# Patient Record
Sex: FEMALE | Race: BLACK OR AFRICAN AMERICAN | Employment: OTHER | URBAN - METROPOLITAN AREA
[De-identification: names, ages, dates, MRNs, and addresses within clinical notes are randomized per-mention and may not be internally consistent; named-entity substitution may affect disease eponyms.]

---

## 2022-03-18 PROBLEM — K22.2 PEPTIC STRICTURE OF ESOPHAGUS: Status: ACTIVE | Noted: 2017-10-16

## 2022-03-18 PROBLEM — N20.1 URETERAL CALCULI: Status: ACTIVE | Noted: 2018-05-01

## 2022-03-19 PROBLEM — M79.89 LEG SWELLING: Status: ACTIVE | Noted: 2017-10-16

## 2022-03-19 PROBLEM — G62.0 CHEMOTHERAPY-INDUCED NEUROPATHY (HCC): Status: ACTIVE | Noted: 2018-04-11

## 2022-03-19 PROBLEM — R74.8 ELEVATED VITAMIN B12 LEVEL: Status: ACTIVE | Noted: 2017-06-27

## 2022-03-19 PROBLEM — T45.1X5A CHEMOTHERAPY-INDUCED NEUROPATHY (HCC): Status: ACTIVE | Noted: 2018-04-11

## 2022-03-19 PROBLEM — J32.0 CHRONIC MAXILLARY SINUSITIS: Status: ACTIVE | Noted: 2017-10-16

## 2022-03-20 PROBLEM — R20.2 PARESTHESIA: Status: ACTIVE | Noted: 2017-10-16

## 2024-03-25 ENCOUNTER — HOSPITAL ENCOUNTER (OUTPATIENT)
Facility: HOSPITAL | Age: 64
Setting detail: OUTPATIENT SURGERY
Discharge: HOME | End: 2024-03-28
Payer: OTHER GOVERNMENT

## 2024-03-25 VITALS
TEMPERATURE: 97.4 F | OXYGEN SATURATION: 100 % | HEIGHT: 64 IN | DIASTOLIC BLOOD PRESSURE: 79 MMHG | HEART RATE: 91 BPM | SYSTOLIC BLOOD PRESSURE: 125 MMHG | WEIGHT: 111.4 LBS | RESPIRATION RATE: 16 BRPM | BODY MASS INDEX: 19.02 KG/M2

## 2024-03-25 DIAGNOSIS — N20.0 KIDNEY STONE: Primary | ICD-10-CM

## 2024-03-25 LAB — GLUCOSE BLD STRIP.AUTO-MCNC: 77 MG/DL (ref 70–110)

## 2024-03-25 PROCEDURE — 7100000011 HC PHASE II RECOVERY - ADDTL 15 MIN

## 2024-03-25 PROCEDURE — 6360000002 HC RX W HCPCS: Performed by: UROLOGY

## 2024-03-25 PROCEDURE — 2580000003 HC RX 258: Performed by: UROLOGY

## 2024-03-25 PROCEDURE — 99152 MOD SED SAME PHYS/QHP 5/>YRS: CPT

## 2024-03-25 PROCEDURE — 2720000010 HC SURG SUPPLY STERILE

## 2024-03-25 PROCEDURE — 82962 GLUCOSE BLOOD TEST: CPT

## 2024-03-25 PROCEDURE — 50590 FRAGMENTING OF KIDNEY STONE: CPT

## 2024-03-25 PROCEDURE — 2709999900 HC NON-CHARGEABLE SUPPLY

## 2024-03-25 PROCEDURE — 7100000010 HC PHASE II RECOVERY - FIRST 15 MIN

## 2024-03-25 PROCEDURE — 99153 MOD SED SAME PHYS/QHP EA: CPT

## 2024-03-25 RX ORDER — FLUMAZENIL 0.1 MG/ML
0.5 INJECTION INTRAVENOUS ONCE
Status: DISCONTINUED | OUTPATIENT
Start: 2024-03-25 | End: 2024-03-29 | Stop reason: HOSPADM

## 2024-03-25 RX ORDER — TRAMADOL HYDROCHLORIDE 50 MG/1
50 TABLET ORAL EVERY 6 HOURS PRN
Qty: 12 TABLET | Refills: 0 | Status: SHIPPED | OUTPATIENT
Start: 2024-03-25 | End: 2024-03-28

## 2024-03-25 RX ORDER — MIDAZOLAM HYDROCHLORIDE 1 MG/ML
6 INJECTION INTRAMUSCULAR; INTRAVENOUS PRN
Status: DISCONTINUED | OUTPATIENT
Start: 2024-03-25 | End: 2024-03-29 | Stop reason: HOSPADM

## 2024-03-25 RX ORDER — SODIUM CHLORIDE, SODIUM LACTATE, POTASSIUM CHLORIDE, CALCIUM CHLORIDE 600; 310; 30; 20 MG/100ML; MG/100ML; MG/100ML; MG/100ML
INJECTION, SOLUTION INTRAVENOUS CONTINUOUS
Status: DISCONTINUED | OUTPATIENT
Start: 2024-03-25 | End: 2024-03-29 | Stop reason: HOSPADM

## 2024-03-25 RX ORDER — NALOXONE HYDROCHLORIDE 0.4 MG/ML
1 INJECTION, SOLUTION INTRAMUSCULAR; INTRAVENOUS; SUBCUTANEOUS PRN
Status: DISCONTINUED | OUTPATIENT
Start: 2024-03-25 | End: 2024-03-29 | Stop reason: HOSPADM

## 2024-03-25 RX ORDER — OXYCODONE HYDROCHLORIDE AND ACETAMINOPHEN 5; 325 MG/1; MG/1
1 TABLET ORAL EVERY 4 HOURS PRN
OUTPATIENT
Start: 2024-03-25

## 2024-03-25 RX ORDER — FENTANYL CITRATE 50 UG/ML
300 INJECTION, SOLUTION INTRAMUSCULAR; INTRAVENOUS PRN
Status: DISCONTINUED | OUTPATIENT
Start: 2024-03-25 | End: 2024-03-29 | Stop reason: HOSPADM

## 2024-03-25 RX ORDER — SODIUM CHLORIDE, SODIUM LACTATE, POTASSIUM CHLORIDE, CALCIUM CHLORIDE 600; 310; 30; 20 MG/100ML; MG/100ML; MG/100ML; MG/100ML
INJECTION, SOLUTION INTRAVENOUS CONTINUOUS
OUTPATIENT
Start: 2024-03-25

## 2024-03-25 RX ADMIN — MIDAZOLAM HYDROCHLORIDE 2 MG: 1 INJECTION, SOLUTION INTRAMUSCULAR; INTRAVENOUS at 15:27

## 2024-03-25 RX ADMIN — FENTANYL CITRATE 100 MCG: 50 INJECTION INTRAMUSCULAR; INTRAVENOUS at 15:26

## 2024-03-25 RX ADMIN — SODIUM CHLORIDE, POTASSIUM CHLORIDE, SODIUM LACTATE AND CALCIUM CHLORIDE: 600; 310; 30; 20 INJECTION, SOLUTION INTRAVENOUS at 14:19

## 2024-03-25 RX ADMIN — WATER 2000 MG: 1 INJECTION INTRAMUSCULAR; INTRAVENOUS; SUBCUTANEOUS at 15:18

## 2024-03-25 ASSESSMENT — PAIN - FUNCTIONAL ASSESSMENT
PAIN_FUNCTIONAL_ASSESSMENT: NONE - DENIES PAIN
PAIN_FUNCTIONAL_ASSESSMENT: NONE - DENIES PAIN

## 2024-03-25 ASSESSMENT — PAIN SCALES - GENERAL: PAINLEVEL_OUTOF10: 3

## 2024-03-25 NOTE — PERIOP NOTE
EKG strip taped to EKG monitoring sheet    Staff:  Dr. Wendi Huff, RN  Roxie Sepulveda, RT    Procedure: Extracorporeal Shockwave Lithotripsy-Left Kidney     Treatment site: Left flank area-Abrasion

## 2024-03-25 NOTE — DISCHARGE INSTR - SUPPLEMENTARY INSTRUCTIONS
DISCHARGE SUMMARY from Nurse    PATIENT INSTRUCTIONS:    After general anesthesia or intravenous sedation, for 24 hours or while taking prescription Narcotics:  Limit your activities  Do not drive and operate hazardous machinery  Do not make important personal or business decisions  Do  not drink alcoholic beverages  If you have not urinated within 8 hours after discharge, please contact your surgeon on call.    Report the following to your surgeon:  Excessive pain, swelling, redness or odor of or around the surgical area  Temperature over 100.5  Nausea and vomiting lasting longer than 4 hours or if unable to take medications  Any signs of decreased circulation or nerve impairment to extremity: change in color, persistent  numbness, tingling, coldness or increase pain  Any questions    What to do at Home:  Recommended activity: activity as tolerated.    *  Please give a list of your current medications to your Primary Care Provider.    *  Please update this list whenever your medications are discontinued, doses are      changed, or new medications (including over-the-counter products) are added.    *  Please carry medication information at all times in case of emergency situations.    These are general instructions for a healthy lifestyle:    No smoking/ No tobacco products/ Avoid exposure to second hand smoke  Surgeon General's Warning:  Quitting smoking now greatly reduces serious risk to your health.    Obesity, smoking, and sedentary lifestyle greatly increases your risk for illness    A healthy diet, regular physical exercise & weight monitoring are important for maintaining a healthy lifestyle    You may be retaining fluid if you have a history of heart failure or if you experience any of the following symptoms:  Weight gain of 3 pounds or more overnight or 5 pounds in a week, increased swelling in our hands or feet or shortness of breath while lying flat in bed.  Please call your doctor as soon as you

## 2024-03-25 NOTE — H&P
Update History & Physical    The patient's History and Physical of February 27, 2024 was reviewed with the patient and I examined the patient. There was no change. The surgical site was confirmed by the patient and me.       Plan: The risks, benefits, expected outcome, and alternative to the recommended procedure have been discussed with the patient. Patient understands and wants to proceed with the procedure.     Electronically signed by Rajesh Adame MD on 3/25/2024 at 3:14 PM      [facility]  [address]  [facility] [address]  Tel:  [phone]  Fax: [phone]    Patient :  Madelyn Brambila  YOB: 1960  Birth Sex:  Female  Current Gender:  Female  Date:   02/27/2024 9:15 AM   Visit Type:    Office Visit  Assessment/Plan  #  Detail Type  Description    Assessment  Calculus of kidney (N20.0).    Patient Plan  Chronic unstable   Management:  Mildly symptomatic left kidney stone.  Discussed treatment with patient.  Patient wishes to undergo ESWL.  Plan for left ESWL.  PAT per hospital protocol.  Follow-up:  Next available left ESWL.  Renal.              Additional Visit Information    This 64 year old patient presents for Kidney and/or Ureteral Stone.    History of Present Illness  1.  Kidney and/or Ureteral Stone   Onset was gradual. The problem is improving. Prior stone type of unknown. There have not been any recent ER visits. Pertinent history includes history of prior stone(s) and past lithotripsy. There are no aggravating factors. The patient lists no relieving factors. Pertinent negatives include chills, constipation, diarrhea, fever, nausea, urinary blood clots, dribbling (urinary), frequency (urinary), urinary straining and vomiting. Additional information: hx of breast cancer 2012. Pt has hx of kidney stones treated by Dr. Aaron Magallanes started 2018. Had ESWL 7/2020. Pt had renal US 10/16/20 rt 7mm and 3mm stone lt 5mm stone kidney and mild left

## 2024-03-25 NOTE — OP NOTE
Operative Note      Patient: Madelyn Brambila  YOB: 1960  MRN: [id number]    Date of Procedure: 3/25/2024    Kidney stone Left    Post-Op Diagnosis: same as preop       Left Renal ESWL    Assistant:   None    Anesthesia: * No anesthesia type entered *    Estimated Blood Loss (mL): zero    Complications: None    Specimens:   * No specimens in log *    Implants:  * No implants in log *      Drains: * No LDAs found *    Findings: Left lower pole 7mm kidney stone, fragmented well on fluoroscopy.        Detailed Description of Procedure:   Procedure Details:  The patient was brought into the OR and was identified. Time out was called using two identifiers. She received conscious sedation using fentanyl and versed as needed by the nurse. The F1 and F2 focus points were used to locate the stone on the Left kidney. Once the stone was localized, the "[employer] lithotripter machine was used for the procedure.  The rate was 90 shocks per minute. Power was increased from 3 to 7. A total shock of 3000 was applied to the stone. At the completion, the stone looked to be fragmented well.    The patient tolerated the procedure very well. He was aroused safely and transferred to the PACU in stable condition.     Plan:  KUB in 2 weeks prior to office visit.     Electronically signed by Rajesh Adame MD on 3/25/2024 at 4:01 PM

## 2024-03-25 NOTE — DISCHARGE INSTRUCTIONS
[facility], Urology     [facility], [address] [facility], [address], [place] [address]  Office: [phone]  Fax:    [phone]    PROCEDURE     Left renal ESWL  __  NOTIFY [facility] IMMEDIATELY IF ANY OF THE FOLLOWING OCCUR:    You are unable to urinate.  Urgency to urinate is not uncommon.   You find yourself urinating small frequent amounts associated with severe lower abdominal discomfort.   Bright red blood with clots in the urine. Some reddish urine is not uncommon and should be treated with increasing the amount of fluids you drink.   Temperature above 101.5° and / or chills.   You are nauseous and / or vomiting and you cannot hold down any fluids.   Your pain is not controlled with the pain medication prescribed.    SPECIAL CONSIDERATIONS:     Do not drive for at least 24 hours after the procedure and until you are no longer taking narcotic pain medication and you are able to move and react without hesitation.  You may return to work in 24-48 hours          _x_  Strain all urine and bring all stone fragments to your follow up visit.   _x_  No heavy lifting over 10 pounds & no strenuous exercise for 1 week    _x_  Our office will call you to make an appointment in 2-3 weeks.     Please contact [facility] at [phone] or go to the nearest Emergency Department / Urgent Care facility for any other medical questions or concerns.

## 2024-03-25 NOTE — PERIOP NOTE
TRANSFER - IN REPORT:    Verbal report received from elieser jimenez on Madelyn Brambila  being received from or 2 for routine post-op      Report consisted of patient's Situation, Background, Assessment and   Recommendations(SBAR).     Information from the following report(s) Nurse Handoff Report was reviewed with the receiving nurse.    Opportunity for questions and clarification was provided.      Assessment completed upon patient's arrival to unit and care assumed.